# Patient Record
Sex: MALE | Race: WHITE | Employment: OTHER | URBAN - METROPOLITAN AREA
[De-identification: names, ages, dates, MRNs, and addresses within clinical notes are randomized per-mention and may not be internally consistent; named-entity substitution may affect disease eponyms.]

---

## 2017-06-12 PROCEDURE — 82043 UR ALBUMIN QUANTITATIVE: CPT | Performed by: FAMILY MEDICINE

## 2017-06-12 PROCEDURE — 82570 ASSAY OF URINE CREATININE: CPT | Performed by: FAMILY MEDICINE

## 2018-03-16 PROCEDURE — 88305 TISSUE EXAM BY PATHOLOGIST: CPT | Performed by: INTERNAL MEDICINE

## 2018-09-19 PROCEDURE — 82043 UR ALBUMIN QUANTITATIVE: CPT | Performed by: FAMILY MEDICINE

## 2018-09-19 PROCEDURE — 82570 ASSAY OF URINE CREATININE: CPT | Performed by: FAMILY MEDICINE

## 2021-02-15 RX ORDER — ACETAMINOPHEN 500 MG
1000 TABLET ORAL ONCE
Status: CANCELLED | OUTPATIENT
Start: 2021-02-15 | End: 2021-02-15

## 2021-02-15 NOTE — H&P
[facility]    PATIENT'S NAME: Ailyn Kathy   ATTENDING PHYSICIAN: Jessika Crook M.D.    PATIENT ACCOUNT#:   [de-identified]    LOCATION:    MEDICAL RECORD #:   [id number]       YOB: 1945  ADMISSION DATE:       03/03/2021    HISTORY AN Sister alive with diabetes. Two other sisters alive. Three brothers alive; 2 have diabetes. SOCIAL HISTORY:  Patient is a smoker. He smokes 0.01 packs of cigarettes for the last 38 years. He has never used smokeless tobacco.  He does drink alcohol.

## 2021-02-18 ENCOUNTER — LABORATORY ENCOUNTER (OUTPATIENT)
Dept: LAB | Age: 76
End: 2021-02-18
Attending: ORTHOPAEDIC SURGERY
Payer: MEDICARE

## 2021-02-18 DIAGNOSIS — M25.561 PAIN IN BOTH KNEES, UNSPECIFIED CHRONICITY: ICD-10-CM

## 2021-02-18 DIAGNOSIS — M25.562 PAIN IN BOTH KNEES, UNSPECIFIED CHRONICITY: ICD-10-CM

## 2021-02-18 PROCEDURE — 87081 CULTURE SCREEN ONLY: CPT

## 2021-02-25 ENCOUNTER — ANESTHESIA EVENT (OUTPATIENT)
Dept: SURGERY | Facility: HOSPITAL | Age: 76
End: 2021-02-25
Payer: MEDICARE

## 2021-03-01 ENCOUNTER — LAB ENCOUNTER (OUTPATIENT)
Dept: LAB | Age: 76
End: 2021-03-01
Attending: ORTHOPAEDIC SURGERY
Payer: MEDICARE

## 2021-03-01 DIAGNOSIS — M25.561 PAIN IN BOTH KNEES, UNSPECIFIED CHRONICITY: ICD-10-CM

## 2021-03-01 DIAGNOSIS — M25.562 PAIN IN BOTH KNEES, UNSPECIFIED CHRONICITY: ICD-10-CM

## 2021-03-02 LAB — SARS-COV-2 RNA RESP QL NAA+PROBE: NOT DETECTED

## 2021-03-02 RX ORDER — HYDROCODONE BITARTRATE AND ACETAMINOPHEN 10; 325 MG/1; MG/1
1-2 TABLET ORAL EVERY 6 HOURS PRN
Qty: 40 TABLET | Refills: 0 | Status: SHIPPED | OUTPATIENT
Start: 2021-03-03

## 2021-03-02 NOTE — ANESTHESIA PREPROCEDURE EVALUATION
PRE-OP EVALUATION    Patient Name: Haseeb Kim    Pre-op Diagnosis: Pain in both knees, unspecified chronicity [M25.561, M25.562]    Procedure(s):  BILATERAL TOTAL KNEE ARTHROPLASTIES    Surgeon(s) and Role:     Oscar Velez MD - Primary    Pre reviewed.   Lab Results   Component Value Date    WBC 8.63 02/19/2021    RBC 4.94 02/19/2021    HGB 14.1 02/19/2021    HCT 44.4 02/19/2021    MCV 89.9 02/19/2021    MCH 28.5 02/19/2021    MCHC 31.8 02/19/2021    RDW 14.2 02/19/2021     02/19/2021

## 2021-03-03 ENCOUNTER — HOSPITAL ENCOUNTER (OUTPATIENT)
Facility: HOSPITAL | Age: 76
Discharge: HOME | End: 2021-03-04
Attending: ORTHOPAEDIC SURGERY | Admitting: ORTHOPAEDIC SURGERY
Payer: MEDICARE

## 2021-03-03 ENCOUNTER — APPOINTMENT (OUTPATIENT)
Dept: GENERAL RADIOLOGY | Facility: HOSPITAL | Age: 76
End: 2021-03-03
Attending: ORTHOPAEDIC SURGERY
Payer: MEDICARE

## 2021-03-03 ENCOUNTER — ANESTHESIA (OUTPATIENT)
Dept: SURGERY | Facility: HOSPITAL | Age: 76
End: 2021-03-03
Payer: MEDICARE

## 2021-03-03 DIAGNOSIS — M25.562 PAIN IN BOTH KNEES, UNSPECIFIED CHRONICITY: Primary | ICD-10-CM

## 2021-03-03 DIAGNOSIS — M25.561 PAIN IN BOTH KNEES, UNSPECIFIED CHRONICITY: Primary | ICD-10-CM

## 2021-03-03 PROBLEM — Z47.89 ORTHOPEDIC AFTERCARE: Status: ACTIVE | Noted: 2021-03-03

## 2021-03-03 LAB
ANTIBODY SCREEN: NEGATIVE
GLUCOSE BLD-MCNC: 120 MG/DL (ref 70–99)
GLUCOSE BLD-MCNC: 136 MG/DL (ref 70–99)
GLUCOSE BLD-MCNC: 176 MG/DL (ref 70–99)
GLUCOSE BLD-MCNC: 188 MG/DL (ref 70–99)
GLUCOSE BLD-MCNC: 195 MG/DL (ref 70–99)
RH BLOOD TYPE: POSITIVE

## 2021-03-03 PROCEDURE — 76942 ECHO GUIDE FOR BIOPSY: CPT | Performed by: ANESTHESIOLOGY

## 2021-03-03 PROCEDURE — 0SRC069 REPLACEMENT OF RIGHT KNEE JOINT WITH OXIDIZED ZIRCONIUM ON POLYETHYLENE SYNTHETIC SUBSTITUTE, CEMENTED, OPEN APPROACH: ICD-10-PCS | Performed by: ORTHOPAEDIC SURGERY

## 2021-03-03 PROCEDURE — 97161 PT EVAL LOW COMPLEX 20 MIN: CPT

## 2021-03-03 PROCEDURE — 82962 GLUCOSE BLOOD TEST: CPT

## 2021-03-03 PROCEDURE — 88305 TISSUE EXAM BY PATHOLOGIST: CPT | Performed by: ORTHOPAEDIC SURGERY

## 2021-03-03 PROCEDURE — 97116 GAIT TRAINING THERAPY: CPT

## 2021-03-03 PROCEDURE — 86901 BLOOD TYPING SEROLOGIC RH(D): CPT

## 2021-03-03 PROCEDURE — 86900 BLOOD TYPING SEROLOGIC ABO: CPT

## 2021-03-03 PROCEDURE — 73560 X-RAY EXAM OF KNEE 1 OR 2: CPT | Performed by: ORTHOPAEDIC SURGERY

## 2021-03-03 PROCEDURE — 86850 RBC ANTIBODY SCREEN: CPT

## 2021-03-03 PROCEDURE — 0SRD069 REPLACEMENT OF LEFT KNEE JOINT WITH OXIDIZED ZIRCONIUM ON POLYETHYLENE SYNTHETIC SUBSTITUTE, CEMENTED, OPEN APPROACH: ICD-10-PCS | Performed by: ORTHOPAEDIC SURGERY

## 2021-03-03 PROCEDURE — 88311 DECALCIFY TISSUE: CPT | Performed by: ORTHOPAEDIC SURGERY

## 2021-03-03 DEVICE — PSNA CM FM/CM TIB/VE: Type: IMPLANTABLE DEVICE | Status: FUNCTIONAL

## 2021-03-03 DEVICE — PSN STR HYB ST 14X+30 M: Type: IMPLANTABLE DEVICE | Site: KNEE | Status: FUNCTIONAL

## 2021-03-03 DEVICE — PSN FEM PS CMT CCR STD SZ11 R: Type: IMPLANTABLE DEVICE | Site: KNEE | Status: FUNCTIONAL

## 2021-03-03 DEVICE — IMPLANTABLE DEVICE: Type: IMPLANTABLE DEVICE | Site: KNEE | Status: FUNCTIONAL

## 2021-03-03 DEVICE — PSN ALL POLY PAT PLY 41MM: Type: IMPLANTABLE DEVICE | Site: KNEE | Status: FUNCTIONAL

## 2021-03-03 DEVICE — PSN TIB STM 5 DEG SZ H L: Type: IMPLANTABLE DEVICE | Site: KNEE | Status: FUNCTIONAL

## 2021-03-03 DEVICE — PSN FEM PS CMT CCR STD SZ11 L: Type: IMPLANTABLE DEVICE | Site: KNEE | Status: FUNCTIONAL

## 2021-03-03 DEVICE — REFOBACIN BC R 1X40 US: Type: IMPLANTABLE DEVICE | Site: KNEE | Status: FUNCTIONAL

## 2021-03-03 DEVICE — PSN TIB STM 5 DEG SZ H R: Type: IMPLANTABLE DEVICE | Site: KNEE | Status: FUNCTIONAL

## 2021-03-03 DEVICE — PSN ASF PS 10MM VE L 10-12 GH: Type: IMPLANTABLE DEVICE | Site: KNEE | Status: FUNCTIONAL

## 2021-03-03 DEVICE — PSN ALL POLY PAT PLY 35MM: Type: IMPLANTABLE DEVICE | Site: KNEE | Status: FUNCTIONAL

## 2021-03-03 RX ORDER — HYDROMORPHONE HYDROCHLORIDE 1 MG/ML
0.4 INJECTION, SOLUTION INTRAMUSCULAR; INTRAVENOUS; SUBCUTANEOUS EVERY 2 HOUR PRN
Status: DISCONTINUED | OUTPATIENT
Start: 2021-03-03 | End: 2021-03-04

## 2021-03-03 RX ORDER — BISACODYL 10 MG
10 SUPPOSITORY, RECTAL RECTAL
Status: DISCONTINUED | OUTPATIENT
Start: 2021-03-03 | End: 2021-03-04

## 2021-03-03 RX ORDER — ACETAMINOPHEN 325 MG/1
TABLET ORAL
Status: COMPLETED
Start: 2021-03-03 | End: 2021-03-03

## 2021-03-03 RX ORDER — OXYCODONE HYDROCHLORIDE 5 MG/1
2.5 TABLET ORAL EVERY 4 HOURS PRN
Status: DISCONTINUED | OUTPATIENT
Start: 2021-03-03 | End: 2021-03-04

## 2021-03-03 RX ORDER — DEXAMETHASONE SODIUM PHOSPHATE 10 MG/ML
INJECTION, SOLUTION INTRAMUSCULAR; INTRAVENOUS AS NEEDED
Status: DISCONTINUED | OUTPATIENT
Start: 2021-03-03 | End: 2021-03-03 | Stop reason: SURG

## 2021-03-03 RX ORDER — ACETAMINOPHEN 325 MG/1
650 TABLET ORAL ONCE
Status: COMPLETED | OUTPATIENT
Start: 2021-03-03 | End: 2021-03-03

## 2021-03-03 RX ORDER — MEPERIDINE HYDROCHLORIDE 25 MG/ML
25 INJECTION INTRAMUSCULAR; INTRAVENOUS; SUBCUTANEOUS
Status: DISCONTINUED | OUTPATIENT
Start: 2021-03-03 | End: 2021-03-03 | Stop reason: HOSPADM

## 2021-03-03 RX ORDER — PROCHLORPERAZINE EDISYLATE 5 MG/ML
10 INJECTION INTRAMUSCULAR; INTRAVENOUS EVERY 6 HOURS PRN
Status: DISCONTINUED | OUTPATIENT
Start: 2021-03-03 | End: 2021-03-04

## 2021-03-03 RX ORDER — SODIUM CHLORIDE, SODIUM LACTATE, POTASSIUM CHLORIDE, CALCIUM CHLORIDE 600; 310; 30; 20 MG/100ML; MG/100ML; MG/100ML; MG/100ML
INJECTION, SOLUTION INTRAVENOUS CONTINUOUS
Status: DISCONTINUED | OUTPATIENT
Start: 2021-03-03 | End: 2021-03-04

## 2021-03-03 RX ORDER — BUPIVACAINE HYDROCHLORIDE 7.5 MG/ML
INJECTION, SOLUTION INTRASPINAL AS NEEDED
Status: DISCONTINUED | OUTPATIENT
Start: 2021-03-03 | End: 2021-03-03 | Stop reason: SURG

## 2021-03-03 RX ORDER — PIOGLITAZONEHYDROCHLORIDE 30 MG/1
30 TABLET ORAL DAILY
COMMUNITY
End: 2021-04-29

## 2021-03-03 RX ORDER — HYDROMORPHONE HYDROCHLORIDE 1 MG/ML
0.5 INJECTION, SOLUTION INTRAMUSCULAR; INTRAVENOUS; SUBCUTANEOUS EVERY 5 MIN PRN
Status: DISCONTINUED | OUTPATIENT
Start: 2021-03-03 | End: 2021-03-03 | Stop reason: HOSPADM

## 2021-03-03 RX ORDER — DEXAMETHASONE SODIUM PHOSPHATE 10 MG/ML
8 INJECTION, SOLUTION INTRAMUSCULAR; INTRAVENOUS ONCE
Status: COMPLETED | OUTPATIENT
Start: 2021-03-04 | End: 2021-03-04

## 2021-03-03 RX ORDER — DEXAMETHASONE SODIUM PHOSPHATE 4 MG/ML
4 VIAL (ML) INJECTION AS NEEDED
Status: DISCONTINUED | OUTPATIENT
Start: 2021-03-03 | End: 2021-03-03 | Stop reason: HOSPADM

## 2021-03-03 RX ORDER — SODIUM CHLORIDE, SODIUM LACTATE, POTASSIUM CHLORIDE, CALCIUM CHLORIDE 600; 310; 30; 20 MG/100ML; MG/100ML; MG/100ML; MG/100ML
INJECTION, SOLUTION INTRAVENOUS CONTINUOUS
Status: DISCONTINUED | OUTPATIENT
Start: 2021-03-03 | End: 2021-03-03 | Stop reason: HOSPADM

## 2021-03-03 RX ORDER — MIDAZOLAM HYDROCHLORIDE 1 MG/ML
1 INJECTION INTRAMUSCULAR; INTRAVENOUS EVERY 5 MIN PRN
Status: DISCONTINUED | OUTPATIENT
Start: 2021-03-03 | End: 2021-03-03 | Stop reason: HOSPADM

## 2021-03-03 RX ORDER — DOCUSATE SODIUM 100 MG/1
100 CAPSULE, LIQUID FILLED ORAL 2 TIMES DAILY
Status: DISCONTINUED | OUTPATIENT
Start: 2021-03-03 | End: 2021-03-04

## 2021-03-03 RX ORDER — POLYETHYLENE GLYCOL 3350 17 G/17G
17 POWDER, FOR SOLUTION ORAL DAILY PRN
Status: DISCONTINUED | OUTPATIENT
Start: 2021-03-03 | End: 2021-03-04

## 2021-03-03 RX ORDER — SODIUM CHLORIDE 9 MG/ML
INJECTION, SOLUTION INTRAVENOUS CONTINUOUS
Status: DISCONTINUED | OUTPATIENT
Start: 2021-03-03 | End: 2021-03-04

## 2021-03-03 RX ORDER — OXYCODONE HYDROCHLORIDE 5 MG/1
5 TABLET ORAL EVERY 4 HOURS PRN
Status: DISCONTINUED | OUTPATIENT
Start: 2021-03-03 | End: 2021-03-04

## 2021-03-03 RX ORDER — KETOROLAC TROMETHAMINE 15 MG/ML
15 INJECTION, SOLUTION INTRAMUSCULAR; INTRAVENOUS EVERY 6 HOURS
Status: COMPLETED | OUTPATIENT
Start: 2021-03-03 | End: 2021-03-04

## 2021-03-03 RX ORDER — HYDROMORPHONE HYDROCHLORIDE 1 MG/ML
0.2 INJECTION, SOLUTION INTRAMUSCULAR; INTRAVENOUS; SUBCUTANEOUS EVERY 2 HOUR PRN
Status: DISCONTINUED | OUTPATIENT
Start: 2021-03-03 | End: 2021-03-04

## 2021-03-03 RX ORDER — ACETAMINOPHEN 325 MG/1
650 TABLET ORAL 4 TIMES DAILY
Status: DISCONTINUED | OUTPATIENT
Start: 2021-03-03 | End: 2021-03-04

## 2021-03-03 RX ORDER — ATORVASTATIN CALCIUM 10 MG/1
10 TABLET, FILM COATED ORAL NIGHTLY
COMMUNITY
End: 2021-04-29

## 2021-03-03 RX ORDER — TRAMADOL HYDROCHLORIDE 50 MG/1
50 TABLET ORAL EVERY 6 HOURS
Status: DISCONTINUED | OUTPATIENT
Start: 2021-03-03 | End: 2021-03-04

## 2021-03-03 RX ORDER — DEXTROSE MONOHYDRATE 25 G/50ML
50 INJECTION, SOLUTION INTRAVENOUS
Status: DISCONTINUED | OUTPATIENT
Start: 2021-03-03 | End: 2021-03-03 | Stop reason: HOSPADM

## 2021-03-03 RX ORDER — DIPHENHYDRAMINE HYDROCHLORIDE 50 MG/ML
25 INJECTION INTRAMUSCULAR; INTRAVENOUS ONCE AS NEEDED
Status: ACTIVE | OUTPATIENT
Start: 2021-03-03 | End: 2021-03-03

## 2021-03-03 RX ORDER — ONDANSETRON 2 MG/ML
4 INJECTION INTRAMUSCULAR; INTRAVENOUS AS NEEDED
Status: DISCONTINUED | OUTPATIENT
Start: 2021-03-03 | End: 2021-03-03 | Stop reason: HOSPADM

## 2021-03-03 RX ORDER — NALOXONE HYDROCHLORIDE 0.4 MG/ML
80 INJECTION, SOLUTION INTRAMUSCULAR; INTRAVENOUS; SUBCUTANEOUS AS NEEDED
Status: DISCONTINUED | OUTPATIENT
Start: 2021-03-03 | End: 2021-03-03 | Stop reason: HOSPADM

## 2021-03-03 RX ORDER — BUPRENORPHINE HYDROCHLORIDE 0.32 MG/ML
INJECTION INTRAMUSCULAR; INTRAVENOUS AS NEEDED
Status: DISCONTINUED | OUTPATIENT
Start: 2021-03-03 | End: 2021-03-03 | Stop reason: SURG

## 2021-03-03 RX ORDER — SODIUM PHOSPHATE, DIBASIC AND SODIUM PHOSPHATE, MONOBASIC 7; 19 G/133ML; G/133ML
1 ENEMA RECTAL ONCE AS NEEDED
Status: DISCONTINUED | OUTPATIENT
Start: 2021-03-03 | End: 2021-03-04

## 2021-03-03 RX ORDER — SENNOSIDES 8.6 MG
17.2 TABLET ORAL NIGHTLY
Status: DISCONTINUED | OUTPATIENT
Start: 2021-03-03 | End: 2021-03-04

## 2021-03-03 RX ORDER — DIPHENHYDRAMINE HCL 25 MG
25 CAPSULE ORAL EVERY 4 HOURS PRN
Status: DISCONTINUED | OUTPATIENT
Start: 2021-03-03 | End: 2021-03-04

## 2021-03-03 RX ORDER — CEFAZOLIN SODIUM/WATER 2 G/20 ML
2 SYRINGE (ML) INTRAVENOUS ONCE
Status: DISCONTINUED | OUTPATIENT
Start: 2021-03-03 | End: 2021-03-03 | Stop reason: HOSPADM

## 2021-03-03 RX ORDER — ATORVASTATIN CALCIUM 10 MG/1
10 TABLET, FILM COATED ORAL NIGHTLY
Status: DISCONTINUED | OUTPATIENT
Start: 2021-03-03 | End: 2021-03-04

## 2021-03-03 RX ORDER — MIDAZOLAM HYDROCHLORIDE 1 MG/ML
INJECTION INTRAMUSCULAR; INTRAVENOUS AS NEEDED
Status: DISCONTINUED | OUTPATIENT
Start: 2021-03-03 | End: 2021-03-03 | Stop reason: SURG

## 2021-03-03 RX ORDER — BUPIVACAINE HYDROCHLORIDE 2.5 MG/ML
INJECTION, SOLUTION EPIDURAL; INFILTRATION; INTRACAUDAL AS NEEDED
Status: DISCONTINUED | OUTPATIENT
Start: 2021-03-03 | End: 2021-03-03 | Stop reason: SURG

## 2021-03-03 RX ORDER — DIPHENHYDRAMINE HYDROCHLORIDE 50 MG/ML
12.5 INJECTION INTRAMUSCULAR; INTRAVENOUS EVERY 4 HOURS PRN
Status: DISCONTINUED | OUTPATIENT
Start: 2021-03-03 | End: 2021-03-04

## 2021-03-03 RX ORDER — CEFAZOLIN SODIUM/WATER 2 G/20 ML
2 SYRINGE (ML) INTRAVENOUS EVERY 8 HOURS
Status: COMPLETED | OUTPATIENT
Start: 2021-03-03 | End: 2021-03-03

## 2021-03-03 RX ORDER — ONDANSETRON 2 MG/ML
4 INJECTION INTRAMUSCULAR; INTRAVENOUS EVERY 4 HOURS PRN
Status: DISCONTINUED | OUTPATIENT
Start: 2021-03-03 | End: 2021-03-04

## 2021-03-03 RX ORDER — MELATONIN
325
Status: DISCONTINUED | OUTPATIENT
Start: 2021-03-03 | End: 2021-03-04

## 2021-03-03 RX ADMIN — SODIUM CHLORIDE, SODIUM LACTATE, POTASSIUM CHLORIDE, CALCIUM CHLORIDE: 600; 310; 30; 20 INJECTION, SOLUTION INTRAVENOUS at 08:00:00

## 2021-03-03 RX ADMIN — MIDAZOLAM HYDROCHLORIDE 2 MG: 1 INJECTION INTRAMUSCULAR; INTRAVENOUS at 07:04:00

## 2021-03-03 RX ADMIN — DEXAMETHASONE SODIUM PHOSPHATE 2 MG: 10 INJECTION, SOLUTION INTRAMUSCULAR; INTRAVENOUS at 07:18:00

## 2021-03-03 RX ADMIN — BUPIVACAINE HYDROCHLORIDE 20 ML: 2.5 INJECTION, SOLUTION EPIDURAL; INFILTRATION; INTRACAUDAL at 07:18:00

## 2021-03-03 RX ADMIN — BUPRENORPHINE HYDROCHLORIDE 150 MCG: 0.32 INJECTION INTRAMUSCULAR; INTRAVENOUS at 07:18:00

## 2021-03-03 RX ADMIN — SODIUM CHLORIDE, SODIUM LACTATE, POTASSIUM CHLORIDE, CALCIUM CHLORIDE: 600; 310; 30; 20 INJECTION, SOLUTION INTRAVENOUS at 09:45:00

## 2021-03-03 RX ADMIN — BUPIVACAINE HYDROCHLORIDE 10 ML: 2.5 INJECTION, SOLUTION EPIDURAL; INFILTRATION; INTRACAUDAL at 07:19:00

## 2021-03-03 RX ADMIN — BUPRENORPHINE HYDROCHLORIDE 150 MCG: 0.32 INJECTION INTRAMUSCULAR; INTRAVENOUS at 07:14:00

## 2021-03-03 RX ADMIN — DEXAMETHASONE SODIUM PHOSPHATE 2 MG: 10 INJECTION, SOLUTION INTRAMUSCULAR; INTRAVENOUS at 07:14:00

## 2021-03-03 RX ADMIN — BUPIVACAINE HYDROCHLORIDE 10 ML: 2.5 INJECTION, SOLUTION EPIDURAL; INFILTRATION; INTRACAUDAL at 07:15:00

## 2021-03-03 RX ADMIN — BUPIVACAINE HYDROCHLORIDE 20 ML: 2.5 INJECTION, SOLUTION EPIDURAL; INFILTRATION; INTRACAUDAL at 07:14:00

## 2021-03-03 RX ADMIN — BUPIVACAINE HYDROCHLORIDE 1.4 ML: 7.5 INJECTION, SOLUTION INTRASPINAL at 07:09:00

## 2021-03-03 NOTE — PHYSICAL THERAPY NOTE
PHYSICAL THERAPY KNEE EVALUATION - INPATIENT     Room Number: 384/384-A  Evaluation Date: 3/3/2021  Type of Evaluation: Initial  Physician Order: PT Eval and Treat    Presenting Problem: bilateral TKA 3/3/21  Reason for Therapy: Mobility Dysfunction and [place] Lower Extremity: Weight Bearing as Tolerated  L Lower Extremity: Weight Bearing as Tolerated    PAIN ASSESSMENT  Rating: (L knee 6/10, R knee 5/10)  Location: surgical sites  Management Techniques:  Activity promotion;Repositioning    COGNITION  · Overall C of PT, goals for the session, ankle pumps for DVT prevention. Pt able to perform bilateral quad set, SLR, LAQ. Pt transferred supine to sit independently. BP seated /86. Pt transferred sit to stand with RW, Mod A x2.  Pt marched in place at [facility] clinical presentation is stable and overall the evaluation complexity is considered low. DISCHARGE RECOMMENDATIONS  PT Discharge Recommendations: Home;Outpatient PT(OP PT scheduled 3/8/21)    PLAN  PT Treatment Plan: Bed mobility; Body mechanics; Nando Loving

## 2021-03-03 NOTE — ANESTHESIA POSTPROCEDURE EVALUATION
[address] [place] Patient Status:  Outpatient in a Bed   Age/Gender 70year old male MRN [id number]   [facility] SURGERY Attending Imtiaz Mercado MD   Hosp Day # 0 PCP Danette Ac MD       Anesthesia Post-op Note

## 2021-03-03 NOTE — INTERVAL H&P NOTE
Pre-op Diagnosis: Pain in both knees, unspecified chronicity [M25.561, M25.562]    The above referenced H&P was reviewed by Jonathon Man MD on 3/3/2021, the patient was examined and no significant changes have occurred in the patient's condition since th

## 2021-03-03 NOTE — ANESTHESIA PROCEDURE NOTES
Spinal Block  Performed by: Helder Drew MD  Authorized by: Helder Drew MD       General Information and Staff    Start Time:  3/3/2021 7:05 AM  End Time:  3/3/2021 7:09 AM  Anesthesiologist:  Jaret Del Angel MD  Performed by:   Anesthesiologist  Laretta Runner

## 2021-03-03 NOTE — ANESTHESIA PROCEDURE NOTES
Regional Block  Performed by: Mingo Drew MD  Authorized by: Mingo Drew MD       General Information and Staff    Start Time:  3/3/2021 7:11 AM  End Time:  3/3/2021 7:19 AM  Anesthesiologist:  Vinita Ruggiero MD  Performed by:   Anesthesiologist  P

## 2021-03-03 NOTE — BRIEF OP NOTE
Pre-Operative Diagnosis: Pain in both knees, unspecified chronicity [M25.561, M25.562]     Post-Operative Diagnosis: Pain in both knees, unspecified chronicity [M25.561, M25.562]      Procedure Performed:   Procedure(s):  BILATERAL TOTAL KNEE ARTHROPLASTIE

## 2021-03-03 NOTE — HOME CARE LIAISON
Met with patient at the bedside. Patient politely refused [address] as he has OP PT scheduled for as soon as 3/8. Residential brochure and medicare scores provided with contact information in case the patient changes his mind.  All questions addr

## 2021-03-03 NOTE — PROGRESS NOTES
NURSING ADMISSION NOTE      Patient admitted via Cart/bed from PACU post bilateral knee replacement by Dr. Brandie Newman. Oriented to room. Received awake, alert and oriented x 4, accompanied by his wife. Safety precautions initiated.   Post surgical orders d

## 2021-03-03 NOTE — CONSULTS
[facility] Hospitalist Initial Consult       Reason for consult: Medical Management sp bilateral TKA's      History of Present Illness: Patient is a 70year old male with PMH sig for DM II, HLD, and nicotine dependence who presents sp bilateral TKA's.    He tolera Diabetes Brother    • Diabetes Brother        Review of Systems  All 10 systems otherwise reviewed and negative unless otherwise stated in the HPI.         OBJECTIVE:  /71 (BP Location: Right arm)   Pulse 70   Temp 97.3 °F (36.3 °C)   Resp 19   Ht 5' declines NRT    Prevention:  Eliquis, SCDs, bowel regimen    Outpatient records and previous hospital records reviewed. Thank you for allowing me to participate in the care of this patient.      Bijal Russell  [facility] Hospitalist  Pager [phone]

## 2021-03-03 NOTE — CM/SW NOTE
[facility] met with pt but pt denied need for [facility] and plans to follow up with OP therapy. Pt s/p basilio TKA- will re-eval after therapy tomorrow.     Austen Quinn LCSW  /Discharge Planner  [phone]

## 2021-03-04 VITALS
DIASTOLIC BLOOD PRESSURE: 53 MMHG | HEART RATE: 74 BPM | SYSTOLIC BLOOD PRESSURE: 131 MMHG | HEIGHT: 70 IN | BODY MASS INDEX: 32.45 KG/M2 | WEIGHT: 226.63 LBS | RESPIRATION RATE: 18 BRPM | TEMPERATURE: 99 F | OXYGEN SATURATION: 94 %

## 2021-03-04 LAB
GLUCOSE BLD-MCNC: 153 MG/DL (ref 70–99)
GLUCOSE BLD-MCNC: 191 MG/DL (ref 70–99)
HCT VFR BLD AUTO: 31.9 %
HGB BLD-MCNC: 10.5 G/DL

## 2021-03-04 PROCEDURE — 85018 HEMOGLOBIN: CPT | Performed by: PHYSICIAN ASSISTANT

## 2021-03-04 PROCEDURE — 97165 OT EVAL LOW COMPLEX 30 MIN: CPT

## 2021-03-04 PROCEDURE — 82962 GLUCOSE BLOOD TEST: CPT

## 2021-03-04 PROCEDURE — 85014 HEMATOCRIT: CPT | Performed by: PHYSICIAN ASSISTANT

## 2021-03-04 PROCEDURE — 97110 THERAPEUTIC EXERCISES: CPT

## 2021-03-04 PROCEDURE — 97116 GAIT TRAINING THERAPY: CPT

## 2021-03-04 PROCEDURE — 97535 SELF CARE MNGMENT TRAINING: CPT

## 2021-03-04 RX ORDER — POLYETHYLENE GLYCOL 3350 17 G/17G
17 POWDER, FOR SOLUTION ORAL DAILY PRN
Qty: 30 EACH | Refills: 0 | Status: SHIPPED | COMMUNITY
Start: 2021-03-04

## 2021-03-04 RX ORDER — PSEUDOEPHEDRINE HCL 30 MG
100 TABLET ORAL 2 TIMES DAILY
Qty: 60 CAPSULE | Refills: 0 | Status: SHIPPED | COMMUNITY
Start: 2021-03-04

## 2021-03-04 RX ORDER — MELATONIN
325
Qty: 21 TABLET | Refills: 0 | Status: SHIPPED | COMMUNITY
Start: 2021-03-05

## 2021-03-04 NOTE — PROGRESS NOTES
NURSING DISCHARGE NOTE    Discharged Home via Wheelchair. Accompanied by Spouse and Support staff  Belongings Taken by patient/family. Patient discharged home with wife.  All discharge instructions, medications and follow up care discussed with pa

## 2021-03-04 NOTE — OCCUPATIONAL THERAPY NOTE
OCCUPATIONAL THERAPY QUICK EVALUATION - INPATIENT    Room Number: 384/384-A  Evaluation Date: 3/4/2021     Type of Evaluation: Initial & Quick Eval  Presenting Problem: B TKR    Physician Order: IP Consult to Occupational Therapy  Reason for Therapy:  ADL/ R Lower Extremity: Weight Bearing as Tolerated  L Lower Extremity: Weight Bearing as Tolerated    PAIN ASSESSMENT  Ratin  Location: B knees, L>R  Management Techniques: Activity promotion; Body mechanics;Breathing techniques;Relaxation;Repositioning place; Alarm set    ASSESSMENT     Patient is a 70year old male admitted on 3/3/2021 for B TKR. Complete medical history and occupational profile noted above. Functional outcome measures completed include AMPAC.  The [employer] ' '6-Clicks' Inpatient Daily Acti

## 2021-03-04 NOTE — PHYSICAL THERAPY NOTE
PHYSICAL THERAPY KNEE TREATMENT NOTE - INPATIENT     Room Number: 384/384-A     Session: 1   Number of Visits to Meet Established Goals: 2    Presenting Problem: bilateral TKA 3/3/21    Problem List  Active Problems:    * No active hospital problems.  * wheelchair, bedside commode, etc.): A Little   -   Moving from lying on back to sitting on the side of the bed?: None   How much help from another person does the patient currently need. ..   -   Moving to and from a bed to a chair (including a wheelchair)? Knee ROM   R Knee Flexion (degrees): 80  L Knee Flexion (degrees): 80          Patient End of Session: Up in chair; With [facility] staff;Needs met;Call light within reach;RN aware of session/findings; All patient questions and concerns addressed; Alarm set

## 2021-03-04 NOTE — CM/SW NOTE
03/04/21 1000   CM/SW Referral Data   Referral Source Social Work (self-referral)   Reason for Referral Discharge planning       HOME SITUATION  Type of Home: House   Home Layout: Able to live on main level  Stairs to Enter : 2  Railing:  No     Lives [place]

## 2021-03-04 NOTE — PLAN OF CARE
Patient is alert and oriented x4. Vital signs stable, has chronic dry cough. IS use encouraged. Pain present to both knees, increased to right side. Scheduled medications given for pain relief. CPM use completed, tolerated well.  Transferred x1 person chepe

## 2021-03-04 NOTE — OPERATIVE REPORT
[facility]    PATIENT'S NAME: Karlo Davis   ATTENDING PHYSICIAN: Phuc Amador M.D. OPERATING PHYSICIAN: Phuc Amador M.D.    PATIENT ACCOUNT#:   [de-identified]    LOCATION:  [address] [place] [address]  MEDICAL RECORD #:   [id number]       DATE OF BIRTH: eburnated bone and grade 3 and 4 changes involving the entire weightbearing area of the medial femoral condyle and a portion of the medial tibial plateau. The medial and lateral meniscal remnants were removed. The ACL and PCL were sacrificed.   The infrap intercondylar notch. The knee was fully flexed. Intermedics retractors were positioned. A drill hole was created in the center of the tibial plateau adjacent to the anterior attachment of the lateral meniscus.   The intramedullary device was placed down gave me slightly better stability in flexion and extension. Patellar thickness was measured; it was 27 mm. The patella was osteotomized. The diameter of the patella from superior to inferior was 35 mm. Drill holes were created for the patellar button. minutes, with good capillary refill to the patient's leg following release of the tourniquet. The left lower extremity was exsanguinated with a Dejan bandage, and the tourniquet was raised to 300 mmHg. The left knee arthritis was worse than the right. clear evidence of a complication. The patient went to recovery room in stable condition. The intraoperative findings were discussed with the patient's wife, and postoperative instructions were written.   The assistant was mandatory to assist with position

## 2021-03-04 NOTE — PROGRESS NOTES
[facility] Hospitalist Progress Note                                                                   [address]  10/4/1945    SUBJECTIVE:  Pt seen and examined. States pain controlled. hydroxide, bisacodyl, Fleet Enema, ondansetron HCl, Prochlorperazine Edisylate, diphenhydrAMINE **OR** diphenhydrAMINE HCl, oxyCODONE HCl **OR** oxyCODONE HCl, HYDROmorphone HCl **OR** HYDROmorphone HCl, tiZANidine HCl    Assessment/Plan:  Active Problems:

## 2021-03-29 PROBLEM — M25.60 DECREASED RANGE OF MOTION: Status: ACTIVE | Noted: 2021-03-29

## 2021-03-29 PROBLEM — Z96.653 STATUS POST TOTAL BILATERAL KNEE REPLACEMENT: Status: ACTIVE | Noted: 2021-03-29

## (undated) DEVICE — SOL  .9 1000ML BTL

## (undated) DEVICE — SUTURE VICRYL 2-0 FSL

## (undated) DEVICE — STOCK ORTH TUB 72X8IN NLTX

## (undated) DEVICE — DRAPE LIMB BILATERAL DYNJP8004

## (undated) DEVICE — 2C14 #2 PDO 45 X 45: Brand: 2C14 #2 PDO 45 X 45

## (undated) DEVICE — KENDALL SCD EXPRESS SLEEVES, KNEE LENGTH, MEDIUM: Brand: KENDALL SCD

## (undated) DEVICE — GAUZE SPONGES,12 PLY: Brand: CURITY

## (undated) DEVICE — SUTURE ETHIBOND 1 OS-6

## (undated) DEVICE — Device: Brand: STABLECUT®

## (undated) DEVICE — STERILE POLYISOPRENE POWDER-FREE SURGICAL GLOVES WITH EMOLLIENT COATING: Brand: PROTEXIS

## (undated) DEVICE — SHEET,DRAPE,70X100,STERILE: Brand: MEDLINE

## (undated) DEVICE — CHLORAPREP 26ML APPLICATOR

## (undated) DEVICE — 3M™ IOBAN™ 2 ANTIMICROBIAL INCISE DRAPE 6648EZ: Brand: IOBAN™ 2

## (undated) DEVICE — ZIMMER® STERILE DISPOSABLE TOURNIQUET CUFF WITH PLC, DUAL PORT, SINGLE BLADDER, 34 IN. (86 CM)

## (undated) DEVICE — ABDOMINAL PAD: Brand: DERMACEA

## (undated) DEVICE — BOWL CEMENT MIX QUICK-VAC

## (undated) DEVICE — PADDING CAST SOFT ROLL 4\" STER

## (undated) DEVICE — PADDING CAST SOFT ROLL 6\" STER

## (undated) DEVICE — UNIVERSAL STERIBUMP® STERILE (5/CASE): Brand: UNIVERSAL STERIBUMP®

## (undated) DEVICE — LIGHT HANDLE

## (undated) DEVICE — PLASTC TOOMEY SYRNG DISP

## (undated) DEVICE — SPECIMEN CONTAINER,POSITIVE SEAL INDICATOR, OR PACKAGED: Brand: PRECISION

## (undated) DEVICE — STERILE POLYISOPRENE POWDER-FREE SURGICAL GLOVES: Brand: PROTEXIS

## (undated) DEVICE — SOL  .9 3000ML

## (undated) DEVICE — GOWN,SIRUS,FABRIC-REINFORCED,X-LARGE: Brand: MEDLINE

## (undated) DEVICE — WRAP COOLING KNEE W/ICE PILLOW

## (undated) DEVICE — #10 STERILE BLADE: Brand: POLYMER COATED BLADES

## (undated) DEVICE — CHLORAPREP ORANGE TINT 10.5ML

## (undated) DEVICE — STERILE HOOK LOCK LATEX FREE ELASTIC BANDAGE 6INX5YD: Brand: HOOK LOCK™

## (undated) DEVICE — DRESSING AQUACEL AG 3.5X12

## (undated) DEVICE — PIN HEADED 48MM

## (undated) DEVICE — DECANTER BAG 9": Brand: MEDLINE INDUSTRIES, INC.

## (undated) DEVICE — SUTURE VICRYL 0 CP-1

## (undated) DEVICE — PADDING CAST COTTON  4

## (undated) DEVICE — TOTAL KNEE CDS: Brand: MEDLINE INDUSTRIES, INC.

## (undated) DEVICE — STERILE HOOK LOCK LATEX FREE ELASTIC BANDAGE 4INX5YD: Brand: HOOK LOCK™

## (undated) NOTE — LETTER
[facility] Testing Department  Phone: [phone]  OUTSIDE TESTING RESULT REQUEST      TO:   Dr. Miranda Marion  Today's Date: 2/15/21    FAX #: [phone]     IMPORTANT: FOR YOUR IMMEDIATE ATTENTION  Please FAX all test results listed below to: 61